# Patient Record
Sex: MALE | Race: BLACK OR AFRICAN AMERICAN | NOT HISPANIC OR LATINO | Employment: FULL TIME | URBAN - METROPOLITAN AREA
[De-identification: names, ages, dates, MRNs, and addresses within clinical notes are randomized per-mention and may not be internally consistent; named-entity substitution may affect disease eponyms.]

---

## 2017-01-03 VITALS
HEIGHT: 78 IN | DIASTOLIC BLOOD PRESSURE: 99 MMHG | SYSTOLIC BLOOD PRESSURE: 194 MMHG | OXYGEN SATURATION: 99 % | WEIGHT: 235 LBS | HEART RATE: 80 BPM | RESPIRATION RATE: 18 BRPM | TEMPERATURE: 98 F | BODY MASS INDEX: 27.19 KG/M2

## 2017-01-03 PROCEDURE — 99283 EMERGENCY DEPT VISIT LOW MDM: CPT

## 2017-01-04 ENCOUNTER — HOSPITAL ENCOUNTER (EMERGENCY)
Facility: HOSPITAL | Age: 37
Discharge: HOME | End: 2017-01-04
Attending: EMERGENCY MEDICINE
Payer: MEDICARE

## 2017-01-04 DIAGNOSIS — L73.2 HYDRADENITIS: Primary | ICD-10-CM

## 2017-01-04 RX ORDER — MINOCYCLINE HYDROCHLORIDE 100 MG/1
100 CAPSULE ORAL EVERY 12 HOURS
Qty: 20 CAPSULE | Refills: 0 | Status: SHIPPED | OUTPATIENT
Start: 2017-01-04 | End: 2017-04-28

## 2017-01-04 RX ORDER — ACETAMINOPHEN AND CODEINE PHOSPHATE 300; 30 MG/1; MG/1
1-2 TABLET ORAL EVERY 6 HOURS PRN
Qty: 14 TABLET | Refills: 0 | Status: SHIPPED | OUTPATIENT
Start: 2017-01-04 | End: 2017-04-28

## 2017-01-04 NOTE — DISCHARGE INSTRUCTIONS
Hidradenitis Suppurativa (Abx Only)  Hidradenitis suppurativa is chronic inflammation of the sweat glands. It is considered a severe form of acne. Firm, red, painful bumps called nodules form. These are filled with pus. They often enlarge and may break open and drain. Common affected areas are the armpit, groin, and anal area.  You may have been given antibiotics and anti-inflammatory medications to help treat the flare-up. If nodules keep getting bigger, drainage may be needed. Surgically removing the glands is the most effective treatment in severe cases.  Watch for the signs of early inflammation in the future (small, tender lumps) and follow the treatment advice below.  Home care  The following guidelines will help you care for your inflammation at home:  · If oral antibiotics were prescribed, take all of them as directed.  · Make a warm compress by running hot water over a washcloth. Apply it to the area until the compress cools off. Repeat over a 15-minute period. Apply the hot compress 3 times a day for the first 3 days. Or, you can  the shower and direct the warm spray onto the area.  · Wash the area with antibacterial soap.  · Put on an over-the-counter antibiotic cream 3 to 4 times a day, unless another topical medicine was prescribed.  · Use ibuprofen to control pain and swelling, unless another pain medication was given. If you have kidney disease or ever had a stomach ulcer or GI bleeding, talk with your doctor before using this medication.  · The lesions are not contagious.  · Your condition is not caused by poor hygiene.  Prevention  The following can help you avoid this chronic inflammation:  · Avoid heat and sweating as much as possible.  · Wear loose clothing.  · Avoid shaving the affected area.  · Lose excess weight.  · If you smoke, consider quitting.  · Avoid antiperspirants and deodorants.  Follow-up care  Follow up with your health care provider if symptoms are not improving over the  next 5 days, or as advised by our staff.  When to seek medical care  Get prompt medical attention if any of the following occur:  · Increasing redness  · Increasing pain  · Fever over 100.4°F (38°C) for more than 2 days after treatment, or as directed  · Nodules keep getting bigger  © 6017-5928 [employer]. [address] [place] [address], [place] [address]. All rights reserved. This information is not intended as a substitute for professional medical care. Always follow your healthcare professional's instructions.

## 2017-01-04 NOTE — ED AVS SNAPSHOT
[facility] [address] [place] [address]               Carol Valentino Evgeny   2017  1:04 AM   ED    Description:  Male : 1980   Department:  [facility]           Your Care was Coordinated By:     Provider Role From To    Yen Carter MD Attending Provider 17 0057 --      Reason for Visit     Headache           Diagnoses this Visit        Comments    Hydradenitis    -  Primary       ED Disposition     ED Disposition Condition Comment    Discharge             To Do List           Follow-up Information     Follow up with Dermatology In 2 days.    Specialty:  Dermatology    Contact information:    [address] [place] [address]  [phone]        Follow up with [facility].    Specialty:  Emergency Medicine    Why:  As needed    Contact information:    [address] [place] [id number] [phone]       These Medications        Disp Refills Start End    minocycline (MINOCIN,DYNACIN) 100 MG capsule 20 capsule 0 2017     Take 1 capsule (100 mg total) by mouth every 12 (twelve) hours. - Oral    acetaminophen-codeine 300-30mg (TYLENOL #3) 300-30 mg Tab 14 tablet 0 2017     Take 1-2 tablets by mouth every 6 (six) hours as needed. - Oral      [facility]     [facility] -  Assistance  Registered nurses in the [facility] provide clinical advisement, health education, appointment booking, and other advisory services.  Call for this free service at [phone].             Medications                Verify that the below list of medications is an accurate representation of the medications you are currently taking.  If none reported, the list may be blank. If incorrect, please contact your healthcare provider. Carry this list with you in case of emergency.           Current Medications     acetaminophen-codeine  "300-30mg (TYLENOL #3) 300-30 mg Tab Take 1-2 tablets by mouth every 6 (six) hours as needed.    minocycline (MINOCIN,DYNACIN) 100 MG capsule Take 1 capsule (100 mg total) by mouth every 12 (twelve) hours.    oxycodone-acetaminophen (PERCOCET) 5-325 mg per tablet Take 1 tablet by mouth every 4 (four) hours as needed for Pain.           Clinical Reference Information           Your Vitals Were     BP Pulse Temp Resp Height Weight    194/99 (BP Location: Left arm, Patient Position: Sitting) 80 98 °F (36.7 °C) (Oral) 18 6' 6" (1.981 m) 106.6 kg (235 lb)    SpO2 BMI             99% 27.16 kg/m2         Allergies as of 1/4/2017        Reactions    Amoxil [Amoxicillin]     Bactrim [Sulfamethoxazole-trimethoprim]     Toradol [Ketorolac]     Ultram [Tramadol]       Immunizations Administered on Date of Encounter - 1/4/2017     None      ED Micro, Lab, POCT     None      ED Imaging Orders     None        Discharge Instructions         Hidradenitis Suppurativa (Abx Only)  Hidradenitis suppurativa is chronic inflammation of the sweat glands. It is considered a severe form of acne. Firm, red, painful bumps called nodules form. These are filled with pus. They often enlarge and may break open and drain. Common affected areas are the armpit, groin, and anal area.  You may have been given antibiotics and anti-inflammatory medications to help treat the flare-up. If nodules keep getting bigger, drainage may be needed. Surgically removing the glands is the most effective treatment in severe cases.  Watch for the signs of early inflammation in the future (small, tender lumps) and follow the treatment advice below.  Home care  The following guidelines will help you care for your inflammation at home:  · If oral antibiotics were prescribed, take all of them as directed.  · Make a warm compress by running hot water over a washcloth. Apply it to the area until the compress cools off. Repeat over a 15-minute period. Apply the hot compress 3 " times a day for the first 3 days. Or, you can  the shower and direct the warm spray onto the area.  · Wash the area with antibacterial soap.  · Put on an over-the-counter antibiotic cream 3 to 4 times a day, unless another topical medicine was prescribed.  · Use ibuprofen to control pain and swelling, unless another pain medication was given. If you have kidney disease or ever had a stomach ulcer or GI bleeding, talk with your doctor before using this medication.  · The lesions are not contagious.  · Your condition is not caused by poor hygiene.  Prevention  The following can help you avoid this chronic inflammation:  · Avoid heat and sweating as much as possible.  · Wear loose clothing.  · Avoid shaving the affected area.  · Lose excess weight.  · If you smoke, consider quitting.  · Avoid antiperspirants and deodorants.  Follow-up care  Follow up with your health care provider if symptoms are not improving over the next 5 days, or as advised by our staff.  When to seek medical care  Get prompt medical attention if any of the following occur:  · Increasing redness  · Increasing pain  · Fever over 100.4°F (38°C) for more than 2 days after treatment, or as directed  · Nodules keep getting bigger  © 1048-0402 [employer]. [address] [place] [address], [place] [address]. All rights reserved. This information is not intended as a substitute for professional medical care. Always follow your healthcare professional's instructions.          [facility] Sign-Up     Activating your [facility] account is as easy as 1-2-3!     1) Visit [email or web address], select Sign Up Now, enter this activation code and your date of birth, then select Next.  [email or web address]  Expires: 1/8/2017 12:20 PM      2) Create a username and password to use when you visit [facility] in the future and select a security question in case you lose your password and select Next.    3) Enter your e-mail address and click Sign Up!    Additional  Information  If you have questions, please e-mail [email or web address] or call [phone] to talk to our [facility] staff. Remember, [facility] is NOT to be used for urgent needs. For medical emergencies, dial 911.         Smoking Cessation     If you would like to quit smoking:   You may be eligible for free services if you are a [place] resident and started smoking cigarettes before September 1, 1988.  Call the Smoking Cessation Trust (SCT) toll free at (035) 979-4592 or (875) 812-4154.   Call 1-800-QUIT-NOW if you do not meet the above criteria.             [facility] complies with applicable Federal civil rights laws and does not discriminate on the basis of race, color, national origin, age, disability, or sex.        Language Assistance Services     ATTENTION: Language assistance services are available, free of charge. Please call [phone].      ATENCIÓN: Si habla español, tiene a dasilva disposición servicios gratuitos de asistencia lingüística. Llame al [phone].     CHÚ Ý: N?u b?n nói Ti?ng Vi?t, có các d?ch v? h? tr? ngôn ng? mi?n phí dành cho b?n. G?i s? [phone].

## 2017-01-04 NOTE — ED PROVIDER NOTES
"SCRIBE #1 NOTE: I, Jade Isidro, am scribing for, and in the presence of, Yen Carter MD. I have scribed the entire note.      History      Chief Complaint   Patient presents with    Headache       Review of patient's allergies indicates:   Allergen Reactions    Amoxil [amoxicillin]     Bactrim [sulfamethoxazole-trimethoprim]     Toradol [ketorolac]     Ultram [tramadol]         HPI   HPI    1/4/2017, 1:12 AM   History obtained from the patient      History of Present Illness: Carol Pepper is a 36 y.o. male patient who presents to the Emergency Department for scalp pain. Sxs are constant and moderate in severity. Pt dx with hydradenitis of scalp. Reports he has been dealing with this "for a long time." Pt has not followed up with dermatologist. There are no mitigating or exacerbating factors noted. No associated sxs.  Pt denies any fever, N/V/D, and all other sxs at this time. No further complaints or concerns at this time.     Arrival mode: Personal vehicle      PCP: Primary Doctor No       Past Medical History:  Past Medical History   Diagnosis Date    Hydradenitis     Hypertension        Past Surgical History:  Past Surgical History   Procedure Laterality Date    Knee surgery       left knee    Ankle surgery       left    Shoulder surgery Right          Family History:  Family History   Problem Relation Age of Onset    Diabetes Mother     Kidney disease Father     Diabetes Sister        Social History:  Social History     Social History Main Topics    Smoking status: Current Every Day Smoker     Packs/day: 0.50     Types: Cigarettes    Smokeless tobacco: Never Used    Alcohol use No    Drug use: No    Sexual activity: Not on file       ROS   Review of Systems   Constitutional: Negative for fever.   HENT: Negative for sore throat.         (+) scalp pain   Respiratory: Negative for shortness of breath.    Cardiovascular: Negative for chest pain.   Gastrointestinal: Negative for " "diarrhea, nausea and vomiting.   Genitourinary: Negative for dysuria.   Musculoskeletal: Negative for back pain.   Skin: Negative for rash.   Neurological: Negative for weakness.   Hematological: Does not bruise/bleed easily.       Physical Exam    Initial Vitals   BP Pulse Resp Temp SpO2   01/03/17 2332 01/03/17 2332 01/03/17 2332 01/03/17 2332 01/03/17 2332   194/99 80 18 98 °F (36.7 °C) 99 %      Physical Exam  Nursing Notes and Vital Signs Reviewed.  Constitutional: Patient is in no acute distress. Awake and alert. Well-developed and well-nourished.  Head: Atraumatic. Normocephalic.  Eyes: PERRL. EOM intact. Conjunctivae are not pale. No scleral icterus.  ENT: Mucous membranes are moist. Oropharynx is clear and symmetric.    Neck: Supple. Full ROM. No lymphadenopathy.  Cardiovascular: Regular rate. Regular rhythm. No murmurs, rubs, or gallops. Distal pulses are 2+ and symmetric.  Pulmonary/Chest: No respiratory distress. Clear to auscultation bilaterally. No wheezing, rales, or rhonchi.  Abdominal: Soft and non-distended.  There is no tenderness.  No rebound, guarding, or rigidity.    Musculoskeletal: Moves all extremities. No obvious deformities. No edema. No calf tenderness.  Skin: Warm and dry. Hidradenitis to posterior scalp.  Neurological:  Alert, awake, and appropriate.  Normal speech.  No acute focal neurological deficits are appreciated.  Psychiatric: Normal affect. Good eye contact. Appropriate in content.    ED Course    Procedures  ED Vital Signs:  Vitals:    01/03/17 2332   BP: (!) 194/99   Pulse: 80   Resp: 18   Temp: 98 °F (36.7 °C)   TempSrc: Oral   SpO2: 99%   Weight: 106.6 kg (235 lb)   Height: 6' 6" (1.981 m)              The Emergency Provider reviewed the vital signs and test results, which are outlined above.    ED Discussion     1:14 AM : Initial Encounter. Discussed pt dx and plan of tx. Gave pt all f/u and return to the ED instructions. All questions and concerns were addressed at this " time. Pt expresses understanding of information and instructions, and is comfortable with plan to discharge. Pt is stable for discharge      ED Medication(s):  Medications - No data to display    Discharge Medication List as of 1/4/2017  1:19 AM          Follow-up Information     Follow up with Dermatology In 2 days.    Specialty:  Dermatology    Contact information:    [address] [facility] [address] [phone]        Follow up with [facility].    Specialty:  Emergency Medicine    Why:  As needed    Contact information:    [address] [facility] [address] [phone]            Medical Decision Making    Medical Decision Making:   History:   Old Records Summarized: records from previous admission(s).           Scribe Attestation:   Scribe #1: I performed the above scribed service and the documentation accurately describes the services I performed. I attest to the accuracy of the note.    Attending:   Physician Attestation Statement for Scribe #1: I, Yen Carter MD, personally performed the services described in this documentation, as scribed by Jade Isidro, in my presence, and it is both accurate and complete.          Clinical Impression       ICD-10-CM ICD-9-CM   1. Hydradenitis L73.2 705.83       Disposition:   Disposition: Discharged  Condition: Stable         Yen Carter MD  01/05/17 0731

## 2017-04-28 ENCOUNTER — HOSPITAL ENCOUNTER (EMERGENCY)
Facility: HOSPITAL | Age: 37
Discharge: HOME | End: 2017-04-28
Payer: MEDICARE

## 2017-04-28 VITALS
OXYGEN SATURATION: 98 % | WEIGHT: 245 LBS | HEART RATE: 86 BPM | TEMPERATURE: 98 F | BODY MASS INDEX: 28.35 KG/M2 | HEIGHT: 78 IN | RESPIRATION RATE: 18 BRPM | DIASTOLIC BLOOD PRESSURE: 74 MMHG | SYSTOLIC BLOOD PRESSURE: 156 MMHG

## 2017-04-28 DIAGNOSIS — L02.91 ABSCESS: Primary | ICD-10-CM

## 2017-04-28 PROCEDURE — 99283 EMERGENCY DEPT VISIT LOW MDM: CPT | Mod: 25

## 2017-04-28 PROCEDURE — 10060 I&D ABSCESS SIMPLE/SINGLE: CPT

## 2017-04-28 PROCEDURE — 25000003 PHARM REV CODE 250: Performed by: PHYSICIAN ASSISTANT

## 2017-04-28 RX ORDER — NAPROXEN 500 MG/1
500 TABLET ORAL 2 TIMES DAILY PRN
Qty: 30 TABLET | Refills: 0 | Status: SHIPPED | OUTPATIENT
Start: 2017-04-28 | End: 2017-05-08

## 2017-04-28 RX ORDER — MUPIROCIN 20 MG/G
OINTMENT TOPICAL 3 TIMES DAILY
Qty: 30 G | Refills: 0 | Status: SHIPPED | OUTPATIENT
Start: 2017-04-28 | End: 2017-05-08

## 2017-04-28 RX ORDER — DOXYCYCLINE HYCLATE 100 MG
100 TABLET ORAL EVERY 12 HOURS
Qty: 14 TABLET | Refills: 0 | Status: SHIPPED | OUTPATIENT
Start: 2017-04-28 | End: 2017-05-05

## 2017-04-28 RX ORDER — LIDOCAINE HYDROCHLORIDE 10 MG/ML
100 INJECTION INFILTRATION; PERINEURAL
Status: COMPLETED | OUTPATIENT
Start: 2017-04-28 | End: 2017-04-28

## 2017-04-28 RX ADMIN — LIDOCAINE HYDROCHLORIDE 100 MG: 10 INJECTION, SOLUTION INFILTRATION; PERINEURAL at 05:04

## 2017-04-28 NOTE — DISCHARGE INSTRUCTIONS
Keep wound clean and dry.  Apply warm compresses four times daily to the wound to help draw out infection.  Take antibiotic as prescribed for full course of treatment.  Bactroban ointment three times day.  Keep wound covered when going outside or working.    If symptoms worsen or fail to improve with treatment, fever occurs, or if wound increases in size or fails to respond to antibiotic, see your Primary Care Provider or go to the nearest Emergency Room.      Abscess (Incision & Drainage)  An abscess (sometimes called a boil) occurs when bacteria get trapped under the skin and start to grow. Pus forms inside the abscess as the body responds to the bacteria. An abscess can happen with an insect bite, ingrown hair, blocked oil gland, pimple, cyst, or puncture wound.  Your healthcare provider has drained the pus from your abscess. If the abscess pocket was large, your healthcare provider may have inserted gauze packing. Your provider will need to remove and possibly replace it on your next visit. You may not need antibiotics to treat a simple abscess, unless the infection is spreading into the skin around the wound (cellulitis).  Healing of the wound will take about 1 to 2 weeks, depending on the size of the abscess. Healthy tissue will grow from the bottom and sides of the opening until it seals over.  Home care  These tips can help your wound heal:  · The wound may drain for the first 2 days. Cover the wound with a clean dry dressing. If the dressing becomes soaked with blood or pus, change it.  · If a gauze packing was placed inside the abscess cavity, you may be told to remove it yourself. You may do this in the shower. Once the packing is removed, you should wash the area in the shower or bath 3 to 4 times a day, until the skin opening has closed. Make sure you wash your hands after changing the packing or cleaning the wound.  · If you were prescribed antibiotics, take them as directed until they are all  gone.  · You may use acetaminophen or ibuprofen to control pain, unless another pain medicine was prescribed. If you have liver disease or ever had a stomach ulcer, talk with your doctor before using these medicines.  Follow-up care  Follow up with your healthcare provider, or as advised. If a gauze packing was inserted in your wound, it should be removed in 1 to 2 days. Check your wound every day for the signs of worsening infection listed below.  When to seek medical advice  Call your healthcare provider right away if any of these occur:  · Increasing redness or swelling  · Red streaks in the skin leading away from the wound  · Increasing local pain or swelling  · Continued pus draining from the wound 2 days after treatment  · Fever of 100.4ºF (38ºC) or higher, or as directed by your healthcare provider  · Boil returns when you are at home  Date Last Reviewed: 9/1/2016  © 4382-3836 [employer]. [address] [place] [address], [place] [address]. All rights reserved. This information is not intended as a substitute for professional medical care. Always follow your healthcare professional's instructions.

## 2017-04-28 NOTE — ED AVS SNAPSHOT
[facility] -   [address] [facility] [address]               Carol Pepper   2017  4:45 PM   ED    Description:  Male : 1980   Department:  [facility]           Your Care was Coordinated By:     Provider Role From To    Mellisa Painting PA-C Physician Assistant [id number] --      Reason for Visit     Abscess           Diagnoses this Visit        Comments    Abscess    -  Primary       ED Disposition     None           To Do List           Follow-up Information     Follow up with [facility] In 2 days.    Specialty:  Internal Medicine    Contact information:    [address] [facility] [id number] [phone]    Additional information:    (off [facility]) 1st floor        Follow up with [facility].    Specialty:  Emergency Medicine    Why:  As needed    Contact information:    [address] [place] [id number] [phone]       These Medications        Disp Refills Start End    doxycycline (VIBRA-TABS) 100 MG tablet 14 tablet 0 2017    Take 1 tablet (100 mg total) by mouth every 12 (twelve) hours. - Oral    Notes to Pharmacy: Ok to substitute Doxycycline 100 mg capsule or Doxycycline Monohydrate 100 mg, if available or more cost effective    mupirocin (BACTROBAN) 2 % ointment 30 g 0 2017    Apply topically 3 (three) times daily. - Topical (Top)      [facility]     [facility] Care Line - 24/ Assistance  Unless otherwise directed by your provider, please contact [facility], our nurse care line that is available for 24/7 assistance.     Registered nurses in the [facility] provide: appointment scheduling, clinical advisement, health education, and other advisory services.  Call: [phone] (toll free)               Medications           START taking these NEW medications        Refills  "   doxycycline (VIBRA-TABS) 100 MG tablet 0    Sig: Take 1 tablet (100 mg total) by mouth every 12 (twelve) hours.    Class: Print    Route: Oral    mupirocin (BACTROBAN) 2 % ointment 0    Sig: Apply topically 3 (three) times daily.    Class: Print    Route: Topical (Top)      These medications were administered today        Dose Freq    lidocaine HCL 10 mg/ml (1%) injection 100 mg 100 mg ED 1 Time    Sig: Inject 10 mLs (100 mg total) into the skin ED 1 Time.    Class: Normal    Route: Intradermal      STOP taking these medications     minocycline (MINOCIN,DYNACIN) 100 MG capsule Take 1 capsule (100 mg total) by mouth every 12 (twelve) hours.    oxycodone-acetaminophen (PERCOCET) 5-325 mg per tablet Take 1 tablet by mouth every 4 (four) hours as needed for Pain.    acetaminophen-codeine 300-30mg (TYLENOL #3) 300-30 mg Tab Take 1-2 tablets by mouth every 6 (six) hours as needed.           Verify that the below list of medications is an accurate representation of the medications you are currently taking.  If none reported, the list may be blank. If incorrect, please contact your healthcare provider. Carry this list with you in case of emergency.           Current Medications     doxycycline (VIBRA-TABS) 100 MG tablet Take 1 tablet (100 mg total) by mouth every 12 (twelve) hours.    mupirocin (BACTROBAN) 2 % ointment Apply topically 3 (three) times daily.           Clinical Reference Information           Your Vitals Were     BP Pulse Temp Resp Height Weight    169/100 (BP Location: Right arm, Patient Position: Sitting) 76 97.6 °F (36.4 °C) (Oral) 20 6' 6" (1.981 m) 111.1 kg (245 lb)    SpO2 BMI             95% 28.31 kg/m2         Allergies as of 4/28/2017        Reactions    Amoxil [Amoxicillin]     Bactrim [Sulfamethoxazole-trimethoprim]     Toradol [Ketorolac]     Ultram [Tramadol]       Immunizations Administered on Date of Encounter - 4/28/2017     None      ED Micro, Lab, POCT     None      ED Imaging Orders     " None        Discharge Instructions         Keep wound clean and dry.  Apply warm compresses four times daily to the wound to help draw out infection.  Take antibiotic as prescribed for full course of treatment.  Bactroban ointment three times day.  Keep wound covered when going outside or working.    If symptoms worsen or fail to improve with treatment, fever occurs, or if wound increases in size or fails to respond to antibiotic, see your Primary Care Provider or go to the nearest Emergency Room.      Abscess (Incision & Drainage)  An abscess (sometimes called a boil) occurs when bacteria get trapped under the skin and start to grow. Pus forms inside the abscess as the body responds to the bacteria. An abscess can happen with an insect bite, ingrown hair, blocked oil gland, pimple, cyst, or puncture wound.  Your healthcare provider has drained the pus from your abscess. If the abscess pocket was large, your healthcare provider may have inserted gauze packing. Your provider will need to remove and possibly replace it on your next visit. You may not need antibiotics to treat a simple abscess, unless the infection is spreading into the skin around the wound (cellulitis).  Healing of the wound will take about 1 to 2 weeks, depending on the size of the abscess. Healthy tissue will grow from the bottom and sides of the opening until it seals over.  Home care  These tips can help your wound heal:  · The wound may drain for the first 2 days. Cover the wound with a clean dry dressing. If the dressing becomes soaked with blood or pus, change it.  · If a gauze packing was placed inside the abscess cavity, you may be told to remove it yourself. You may do this in the shower. Once the packing is removed, you should wash the area in the shower or bath 3 to 4 times a day, until the skin opening has closed. Make sure you wash your hands after changing the packing or cleaning the wound.  · If you were prescribed antibiotics, take  them as directed until they are all gone.  · You may use acetaminophen or ibuprofen to control pain, unless another pain medicine was prescribed. If you have liver disease or ever had a stomach ulcer, talk with your doctor before using these medicines.  Follow-up care  Follow up with your healthcare provider, or as advised. If a gauze packing was inserted in your wound, it should be removed in 1 to 2 days. Check your wound every day for the signs of worsening infection listed below.  When to seek medical advice  Call your healthcare provider right away if any of these occur:  · Increasing redness or swelling  · Red streaks in the skin leading away from the wound  · Increasing local pain or swelling  · Continued pus draining from the wound 2 days after treatment  · Fever of 100.4ºF (38ºC) or higher, or as directed by your healthcare provider  · Boil returns when you are at home  Date Last Reviewed: 9/1/2016  © 2640-9208 [employer]. [address] [place] [address]. All rights reserved. This information is not intended as a substitute for professional medical care. Always follow your healthcare professional's instructions.          [facility] Sign-Up     Activating your [facility] account is as easy as 1-2-3!     1) Visit [email or web address], select Sign Up Now, enter this activation code and your date of birth, then select Next.  [email or web address]  Expires: 6/12/2017  6:13 PM      2) Create a username and password to use when you visit [facility] in the future and select a security question in case you lose your password and select Next.    3) Enter your e-mail address and click Sign Up!    Additional Information  If you have questions, please e-mail [email or web address] or call [phone] to talk to our [facility] staff. Remember, [facility] is NOT to be used for urgent needs. For medical emergencies, dial 911.         Smoking Cessation     If you would like to quit smoking:   You may be eligible  for free services if you are a [place] resident and started smoking cigarettes before September 1, 1988.  Call the Smoking Cessation Trust (SCT) toll free at (709) 483-3746 or (265) 603-1166.   Call 1-800-QUIT-NOW if you do not meet the above criteria.   Contact us via email: [email or web address]   View our website for more information: [email or web address]         [facility]  complies with applicable Federal civil rights laws and does not discriminate on the basis of race, color, national origin, age, disability, or sex.        Language Assistance Services     ATTENTION: Language assistance services are available, free of charge. Please call [phone].      ATENCIÓN: Si habla miesha, tiene a dasilva disposición servicios gratuitos de asistencia lingüística. Llame al [phone].     CHÚ Ý: N?u b?n nói Ti?ng Vi?t, có các d?ch v? h? tr? ngôn ng? mi?n phí dành cho b?n. G?i s? [phone].

## 2017-04-28 NOTE — ED PROVIDER NOTES
SCRIBE #1 NOTE: I, Sue Del Angel, am scribing for, and in the presence of, MARIPOSA Rodriguez. I have scribed the entire note.      History      Chief Complaint   Patient presents with    Abscess     to back of neck       Review of patient's allergies indicates:   Allergen Reactions    Amoxil [amoxicillin]     Bactrim [sulfamethoxazole-trimethoprim]     Toradol [ketorolac]     Ultram [tramadol]         HPI   HPI    4/28/2017, 4:59 PM   History obtained from the patient      History of Present Illness: Carol Pepper is a 36 y.o. male patient who presents to the Emergency Department for neck pain secondary to an abscess. Symptoms are constant and moderate in severity. No mitigating or exacerbating factors reported. No associated sxs reported. Patient denies any fever, chills, n/v, drainage, warmth, neck stiffness, back pain, HA, extremity numbness/weakness, and all other sxs at this time. No prior Tx reported. No further complaints or concerns at this time.       Arrival mode: Personal vehicle    PCP: Primary Doctor No       Past Medical History:  Past Medical History:   Diagnosis Date    Hydradenitis     Hypertension        Past Surgical History:  Past Surgical History:   Procedure Laterality Date    ANKLE SURGERY      left    KNEE SURGERY      left knee    SHOULDER SURGERY Right          Family History:  Family History   Problem Relation Age of Onset    Diabetes Mother     Kidney disease Father     Diabetes Sister        Social History:  Social History     Social History Main Topics    Smoking status: Current Every Day Smoker     Packs/day: 0.50     Types: Cigarettes    Smokeless tobacco: Never Used    Alcohol use Yes      Comment: occasionally    Drug use: No    Sexual activity: Not on file       ROS   Review of Systems   Constitutional: Negative for chills and fever.   HENT: Negative for sore throat.    Respiratory: Negative for shortness of breath.    Cardiovascular: Negative for chest  pain.   Gastrointestinal: Negative for nausea and vomiting.   Genitourinary: Negative for dysuria.   Musculoskeletal: Positive for neck pain. Negative for back pain and neck stiffness.   Skin: Negative for rash.        (-) drainage   Neurological: Negative for weakness, numbness and headaches.   Hematological: Does not bruise/bleed easily.   All other systems reviewed and are negative.    Physical Exam    Initial Vitals   BP Pulse Resp Temp SpO2   04/28/17 1631 04/28/17 1631 04/28/17 1631 04/28/17 1631 04/28/17 1631   169/100 76 20 97.6 °F (36.4 °C) 95 %      Physical Exam  Nursing Notes and Vital Signs Reviewed.  Constitutional: Patient is in no acute distress. Awake and alert. Well-developed and well-nourished.  Head: Atraumatic. Normocephalic.  Eyes: PERRL. EOM intact. Conjunctivae are not pale. No scleral icterus.  ENT: Mucous membranes are moist. Oropharynx is clear and symmetric.    Neck: Supple. Full ROM. No lymphadenopathy. 2cm fluctuant abscess to posterior neck  Cardiovascular: Regular rate. Regular rhythm. No murmurs, rubs, or gallops.  Pulmonary/Chest: No respiratory distress. Clear to auscultation bilaterally. No wheezing, rales, or rhonchi.  Abdominal: Soft and non-distended.  There is no tenderness.  No rebound, guarding, or rigidity.  Musculoskeletal: Moves all extremities. No obvious deformities. No edema. No calf tenderness.  Skin: Warm and dry.  Neurological:  Alert, awake, and appropriate.  Normal speech.  No acute focal neurological deficits are appreciated.  Psychiatric: Normal affect. Good eye contact. Appropriate in content.    ED Course    I & D - Incision and Drainage  Date/Time: 4/28/2017 6:17 PM  Performed by: YOLI CLIFTON  Authorized by: RHONDA GOMEZ   Type: abscess  Body area: head/neck  Location details: neck  Anesthesia: local infiltration    Anesthesia:  Anesthesia: local infiltration  Local Anesthetic: lidocaine 1% without epinephrine   Scalpel size: 18 guage  "needle.  Drainage: bloody  Drainage amount: moderate  Wound treatment: drainage and  incision  Packing material: bandaid placed.  Patient tolerance: Patient tolerated the procedure well with no immediate complications        ED Vital Signs:  Vitals:    04/28/17 1631 04/28/17 1833   BP: (!) 169/100 (!) 156/74   Pulse: 76 86   Resp: 20 18   Temp: 97.6 °F (36.4 °C)    TempSrc: Oral    SpO2: 95% 98%   Weight: 111.1 kg (245 lb)    Height: 6' 6" (1.981 m)               The Emergency Provider reviewed the vital signs and test results, which are outlined above.    ED Discussion     6:17 PM: Reassessment of pt.  Pt is awake, alert, and in NAD at this time. Discussed with pt all pertinent ED information and results. Discussed pt dx and plan of tx. Gave pt all f/u and return to the ED instructions. All questions and concerns were addressed at this time. Pt expresses understanding of information and instructions, and is comfortable with plan to discharge. Pt is stable for discharge.    I discussed wound care precautions with patient and/or family/caretaker; specifically that all wounds have risk of infection despite efforts to cleanse and debride the wound; and there is a risk of an occult foreign body (and thus increased risk of infection) despite a negative examination.  I discussed with patient need to return for any signs of infection, specifically redness, increased pain, fever, drainage of pus, or any concern, immediately.    ED Medication(s):  Medications   lidocaine HCL 10 mg/ml (1%) injection 100 mg (100 mg Intradermal Given 4/28/17 1718)       Discharge Medication List as of 4/28/2017  6:13 PM      START taking these medications    Details   doxycycline (VIBRA-TABS) 100 MG tablet Take 1 tablet (100 mg total) by mouth every 12 (twelve) hours., Starting 4/28/2017, Until Fri 5/5/17, Print      mupirocin (BACTROBAN) 2 % ointment Apply topically 3 (three) times daily., Starting 4/28/2017, Until Mon 5/8/17, Print       "       Follow-up Information     Follow up with [facility] - [facility] In 2 days.    Specialty:  Internal Medicine    Contact information:    [address]  [facility] [address] [phone]    Additional information:    (off [facility]) 1st floor        Follow up with [facility]    Specialty:  Emergency Medicine    Why:  As needed    Contact information:    [address]  [facility] [address] [phone]            Medical Decision Making              Scribe Attestation:   Scribe #1: I performed the above scribed service and the documentation accurately describes the services I performed. I attest to the accuracy of the note.    Attending:   Physician Attestation Statement for Scribe #1: I, MARIPOSA Rodriguez, personally performed the services described in this documentation, as scribed by Sue Del Angel, in my presence, and it is both accurate and complete.          Clinical Impression       ICD-10-CM ICD-9-CM   1. Abscess L02.91 682.9       Disposition:   Disposition: Discharged  Condition: Stable         Mellisa Painting PA-C  04/28/17 2128

## 2021-04-27 ENCOUNTER — IMMUNIZATION (OUTPATIENT)
Dept: PRIMARY CARE CLINIC | Facility: CLINIC | Age: 41
End: 2021-04-27

## 2021-04-27 DIAGNOSIS — Z23 NEED FOR VACCINATION: Primary | ICD-10-CM

## 2021-04-27 PROCEDURE — 0002A COVID-19, MRNA, LNP-S, PF, 30 MCG/0.3 ML DOSE VACCINE: CPT | Mod: CV19,S$GLB,, | Performed by: FAMILY MEDICINE

## 2021-04-27 PROCEDURE — 91300 COVID-19, MRNA, LNP-S, PF, 30 MCG/0.3 ML DOSE VACCINE: CPT | Mod: S$GLB,,, | Performed by: FAMILY MEDICINE

## 2021-04-27 PROCEDURE — 0002A COVID-19, MRNA, LNP-S, PF, 30 MCG/0.3 ML DOSE VACCINE: ICD-10-PCS | Mod: CV19,S$GLB,, | Performed by: FAMILY MEDICINE

## 2021-04-27 PROCEDURE — 91300 COVID-19, MRNA, LNP-S, PF, 30 MCG/0.3 ML DOSE VACCINE: ICD-10-PCS | Mod: S$GLB,,, | Performed by: FAMILY MEDICINE
